# Patient Record
Sex: MALE | Race: WHITE | Employment: FULL TIME | URBAN - METROPOLITAN AREA
[De-identification: names, ages, dates, MRNs, and addresses within clinical notes are randomized per-mention and may not be internally consistent; named-entity substitution may affect disease eponyms.]

---

## 2019-03-12 ENCOUNTER — APPOINTMENT (OUTPATIENT)
Dept: GENERAL RADIOLOGY | Age: 44
End: 2019-03-12
Payer: COMMERCIAL

## 2019-03-12 ENCOUNTER — HOSPITAL ENCOUNTER (EMERGENCY)
Age: 44
Discharge: HOME | End: 2019-03-12
Attending: EMERGENCY MEDICINE
Payer: COMMERCIAL

## 2019-03-12 VITALS
RESPIRATION RATE: 18 BRPM | OXYGEN SATURATION: 96 % | DIASTOLIC BLOOD PRESSURE: 79 MMHG | WEIGHT: 200 LBS | HEART RATE: 71 BPM | TEMPERATURE: 100.6 F | HEIGHT: 70 IN | SYSTOLIC BLOOD PRESSURE: 138 MMHG | BODY MASS INDEX: 28.63 KG/M2

## 2019-03-12 DIAGNOSIS — J18.9 PNEUMONIA DUE TO ORGANISM: Primary | ICD-10-CM

## 2019-03-12 LAB
ALBUMIN SERPL-MCNC: 4.4 G/DL (ref 3.5–4.6)
ALP BLD-CCNC: 98 U/L (ref 35–104)
ALT SERPL-CCNC: 42 U/L (ref 0–41)
ANION GAP SERPL CALCULATED.3IONS-SCNC: 11 MEQ/L (ref 9–15)
AST SERPL-CCNC: 27 U/L (ref 0–40)
BASOPHILS ABSOLUTE: 0 K/UL (ref 0–0.2)
BASOPHILS RELATIVE PERCENT: 0.3 %
BILIRUB SERPL-MCNC: 0.6 MG/DL (ref 0.2–0.7)
BUN BLDV-MCNC: 8 MG/DL (ref 6–20)
CALCIUM SERPL-MCNC: 9.4 MG/DL (ref 8.5–9.9)
CHLORIDE BLD-SCNC: 96 MEQ/L (ref 95–107)
CO2: 25 MEQ/L (ref 20–31)
CREAT SERPL-MCNC: 0.93 MG/DL (ref 0.7–1.2)
EOSINOPHILS ABSOLUTE: 0.1 K/UL (ref 0–0.7)
EOSINOPHILS RELATIVE PERCENT: 1.3 %
GFR AFRICAN AMERICAN: >60
GFR NON-AFRICAN AMERICAN: >60
GLOBULIN: 3.2 G/DL (ref 2.3–3.5)
GLUCOSE BLD-MCNC: 119 MG/DL (ref 70–99)
HCT VFR BLD CALC: 48.4 % (ref 42–52)
HEMOGLOBIN: 16.7 G/DL (ref 14–18)
LYMPHOCYTES ABSOLUTE: 1.2 K/UL (ref 1–4.8)
LYMPHOCYTES RELATIVE PERCENT: 10.9 %
MCH RBC QN AUTO: 31.7 PG (ref 27–31.3)
MCHC RBC AUTO-ENTMCNC: 34.5 % (ref 33–37)
MCV RBC AUTO: 91.9 FL (ref 80–100)
MONOCYTES ABSOLUTE: 1.1 K/UL (ref 0.2–0.8)
MONOCYTES RELATIVE PERCENT: 9.3 %
NEUTROPHILS ABSOLUTE: 8.9 K/UL (ref 1.4–6.5)
NEUTROPHILS RELATIVE PERCENT: 78.2 %
PDW BLD-RTO: 13.5 % (ref 11.5–14.5)
PLATELET # BLD: 166 K/UL (ref 130–400)
POTASSIUM SERPL-SCNC: 4.1 MEQ/L (ref 3.4–4.9)
RAPID INFLUENZA  B AGN: NEGATIVE
RAPID INFLUENZA A AGN: NEGATIVE
RBC # BLD: 5.26 M/UL (ref 4.7–6.1)
SODIUM BLD-SCNC: 132 MEQ/L (ref 135–144)
TOTAL PROTEIN: 7.6 G/DL (ref 6.3–8)
WBC # BLD: 11.3 K/UL (ref 4.8–10.8)

## 2019-03-12 PROCEDURE — 2580000003 HC RX 258: Performed by: EMERGENCY MEDICINE

## 2019-03-12 PROCEDURE — 36415 COLL VENOUS BLD VENIPUNCTURE: CPT

## 2019-03-12 PROCEDURE — 87804 INFLUENZA ASSAY W/OPTIC: CPT

## 2019-03-12 PROCEDURE — 6360000002 HC RX W HCPCS: Performed by: EMERGENCY MEDICINE

## 2019-03-12 PROCEDURE — 71046 X-RAY EXAM CHEST 2 VIEWS: CPT

## 2019-03-12 PROCEDURE — 99284 EMERGENCY DEPT VISIT MOD MDM: CPT

## 2019-03-12 PROCEDURE — 85025 COMPLETE CBC W/AUTO DIFF WBC: CPT

## 2019-03-12 PROCEDURE — 6370000000 HC RX 637 (ALT 250 FOR IP): Performed by: EMERGENCY MEDICINE

## 2019-03-12 PROCEDURE — 96374 THER/PROPH/DIAG INJ IV PUSH: CPT

## 2019-03-12 PROCEDURE — 80053 COMPREHEN METABOLIC PANEL: CPT

## 2019-03-12 PROCEDURE — 96375 TX/PRO/DX INJ NEW DRUG ADDON: CPT

## 2019-03-12 RX ORDER — QUETIAPINE FUMARATE 300 MG/1
300 TABLET, FILM COATED ORAL NIGHTLY
COMMUNITY

## 2019-03-12 RX ORDER — AZITHROMYCIN 500 MG/1
500 TABLET, FILM COATED ORAL ONCE
Status: COMPLETED | OUTPATIENT
Start: 2019-03-12 | End: 2019-03-12

## 2019-03-12 RX ORDER — ONDANSETRON 2 MG/ML
4 INJECTION INTRAMUSCULAR; INTRAVENOUS ONCE
Status: COMPLETED | OUTPATIENT
Start: 2019-03-12 | End: 2019-03-12

## 2019-03-12 RX ORDER — 0.9 % SODIUM CHLORIDE 0.9 %
1000 INTRAVENOUS SOLUTION INTRAVENOUS ONCE
Status: COMPLETED | OUTPATIENT
Start: 2019-03-12 | End: 2019-03-12

## 2019-03-12 RX ORDER — ACETAMINOPHEN 500 MG
1000 TABLET ORAL ONCE
Status: COMPLETED | OUTPATIENT
Start: 2019-03-12 | End: 2019-03-12

## 2019-03-12 RX ORDER — KETOROLAC TROMETHAMINE 30 MG/ML
30 INJECTION, SOLUTION INTRAMUSCULAR; INTRAVENOUS ONCE
Status: COMPLETED | OUTPATIENT
Start: 2019-03-12 | End: 2019-03-12

## 2019-03-12 RX ORDER — AZITHROMYCIN 250 MG/1
TABLET, FILM COATED ORAL
Qty: 6 TABLET | Refills: 0 | Status: SHIPPED | OUTPATIENT
Start: 2019-03-12 | End: 2019-03-22

## 2019-03-12 RX ADMIN — CEFTRIAXONE SODIUM 1 G: 1 INJECTION, POWDER, FOR SOLUTION INTRAMUSCULAR; INTRAVENOUS at 03:15

## 2019-03-12 RX ADMIN — SODIUM CHLORIDE 1000 ML: 9 INJECTION, SOLUTION INTRAVENOUS at 02:31

## 2019-03-12 RX ADMIN — ACETAMINOPHEN 1000 MG: 500 TABLET ORAL at 02:30

## 2019-03-12 RX ADMIN — ONDANSETRON 4 MG: 2 INJECTION INTRAMUSCULAR; INTRAVENOUS at 02:30

## 2019-03-12 RX ADMIN — AZITHROMYCIN MONOHYDRATE 500 MG: 500 TABLET ORAL at 03:15

## 2019-03-12 RX ADMIN — KETOROLAC TROMETHAMINE 30 MG: 30 INJECTION, SOLUTION INTRAMUSCULAR; INTRAVENOUS at 02:30

## 2019-03-12 SDOH — HEALTH STABILITY: MENTAL HEALTH: HOW OFTEN DO YOU HAVE A DRINK CONTAINING ALCOHOL?: NEVER

## 2019-03-12 ASSESSMENT — ENCOUNTER SYMPTOMS
ABDOMINAL PAIN: 0
COUGH: 0
PHOTOPHOBIA: 0
RHINORRHEA: 1
CHEST TIGHTNESS: 0
EYE DISCHARGE: 0
SHORTNESS OF BREATH: 0
SORE THROAT: 0
VOMITING: 0
ABDOMINAL DISTENTION: 0
WHEEZING: 0

## 2019-03-12 ASSESSMENT — PAIN DESCRIPTION - LOCATION
LOCATION: GENERALIZED
LOCATION: CHEST

## 2019-03-12 ASSESSMENT — PAIN SCALES - GENERAL
PAINLEVEL_OUTOF10: 5
PAINLEVEL_OUTOF10: 9
PAINLEVEL_OUTOF10: 8

## 2019-03-12 ASSESSMENT — PAIN DESCRIPTION - PAIN TYPE
TYPE: ACUTE PAIN
TYPE: ACUTE PAIN

## 2019-03-12 ASSESSMENT — PAIN DESCRIPTION - DESCRIPTORS
DESCRIPTORS: ACHING
DESCRIPTORS: ACHING

## 2019-03-12 ASSESSMENT — PAIN DESCRIPTION - PROGRESSION: CLINICAL_PROGRESSION: GRADUALLY IMPROVING

## 2019-03-12 ASSESSMENT — PAIN DESCRIPTION - FREQUENCY: FREQUENCY: CONTINUOUS

## 2022-07-21 ENCOUNTER — OFFICE VISIT (OUTPATIENT)
Dept: ORTHOPEDIC SURGERY | Age: 47
End: 2022-07-21
Payer: COMMERCIAL

## 2022-07-21 ENCOUNTER — HOSPITAL ENCOUNTER (OUTPATIENT)
Dept: GENERAL RADIOLOGY | Age: 47
Discharge: HOME | End: 2022-07-23
Payer: COMMERCIAL

## 2022-07-21 VITALS
HEART RATE: 72 BPM | WEIGHT: 182 LBS | RESPIRATION RATE: 16 BRPM | TEMPERATURE: 97.8 F | HEIGHT: 70 IN | OXYGEN SATURATION: 98 % | BODY MASS INDEX: 26.05 KG/M2

## 2022-07-21 DIAGNOSIS — M77.11 LATERAL EPICONDYLITIS OF RIGHT ELBOW: Primary | ICD-10-CM

## 2022-07-21 DIAGNOSIS — M51.36 DDD (DEGENERATIVE DISC DISEASE), LUMBAR: ICD-10-CM

## 2022-07-21 PROCEDURE — 20551 NJX 1 TENDON ORIGIN/INSJ: CPT | Performed by: PHYSICIAN ASSISTANT

## 2022-07-21 PROCEDURE — 72110 X-RAY EXAM L-2 SPINE 4/>VWS: CPT

## 2022-07-21 RX ORDER — LIDOCAINE HYDROCHLORIDE 10 MG/ML
1 INJECTION, SOLUTION INFILTRATION; PERINEURAL ONCE
Status: COMPLETED | OUTPATIENT
Start: 2022-07-21 | End: 2022-07-21

## 2022-07-21 RX ORDER — CYCLOBENZAPRINE HCL 10 MG
10 TABLET ORAL 2 TIMES DAILY PRN
Qty: 90 TABLET | Refills: 0 | Status: SHIPPED | OUTPATIENT
Start: 2022-07-21 | End: 2022-09-04

## 2022-07-21 RX ORDER — MELOXICAM 15 MG/1
15 TABLET ORAL DAILY
Qty: 90 TABLET | Refills: 0 | Status: SHIPPED | OUTPATIENT
Start: 2022-07-21 | End: 2022-10-19

## 2022-07-21 RX ORDER — TRIAMCINOLONE ACETONIDE 40 MG/ML
40 INJECTION, SUSPENSION INTRA-ARTICULAR; INTRAMUSCULAR ONCE
Status: COMPLETED | OUTPATIENT
Start: 2022-07-21 | End: 2022-07-21

## 2022-07-21 RX ADMIN — TRIAMCINOLONE ACETONIDE 40 MG: 40 INJECTION, SUSPENSION INTRA-ARTICULAR; INTRAMUSCULAR at 09:57

## 2022-07-21 RX ADMIN — LIDOCAINE HYDROCHLORIDE 1 ML: 10 INJECTION, SOLUTION INFILTRATION; PERINEURAL at 09:59

## 2022-07-21 ASSESSMENT — ENCOUNTER SYMPTOMS: RESPIRATORY NEGATIVE: 1

## 2022-07-21 NOTE — PROGRESS NOTES
Leroy Warren (:  1975) is a 48 y.o. male,Established patient, here for evaluation of the following chief complaint(s):  Elbow Pain (Right, pt states he was previously getting cortisone injections, and he would like to discuss other options. ) and Back Problem (Pt states he has been having back issues for about the last 15 years, slowly getting worse.)         ASSESSMENT/PLAN:  1. Lateral epicondylitis of right elbow  2. DDD (degenerative disc disease), lumbar  -     XR LUMBAR SPINE (MIN 4 VIEWS); Future      No follow-ups on file. Subjective   SUBJECTIVE/OBJECTIVE:  Is a 76-year-old right-hand-dominant  that I have seen previously for lateral epicondylitis of the right arm. He has been receiving about 8 months relief from cortisone injection in the right elbow for epicondylitis. He presents today requesting cortisone injection. Denies any new injury however he works in a physically demanding job. He is also complaining of some back pain. He sees a chiropractor however, chiropractic manipulation is no longer proving as beneficial as previously. He needs forms filled out for [facility] as well as for light duty restrictions. He denies change in sensation of his extremities. Denies any bowel or bladder dysfunction. Review of Systems   Constitutional: Negative. HENT: Negative. Respiratory: Negative. Skin: Negative. Neurological: Negative. Objective     X-rays lumbar spine performed today reviewed by me show acute fracture dislocation. Disc spaces are well-maintained. No spondylolisthesis with flexion and extension views. Physical Exam  Musculoskeletal:      Comments: Right elbow-no swelling or ecchymosis. No warmth, erythema, fluctuance or sign of infection. Full extension, flexion is 125 degrees about 5 degrees less than the left. Elbow joint is stable, there is no laxity with radial or ulnar stress.   Tenderness with palpation directly over the lateral epicondyles. Elbow pain increases with resisted extension of the right wrist.  Elbow pain increases with resisted pronation of the right wrist.  Medial epicondyles and olecranon are nontender. Sensation is intact distally to light touch. Capillary refill is brisk. Lower back-no midline spinal tenderness. Tenderness with palpation in bilateral L3-S1 paraspinal region. Flexion is decreased at 75 degrees, extension is 10 degrees. Rotation of the right is 40 degrees, rotation to the left is 30 degrees. Right leg raise elicits lower back pain right greater than left. No leg pain. Heel-to-toe ambulation is intact and symmetrical.     Procedure-under sterile technique 1 cc of 1% lidocaine and 1 cc of 40 mg/mL Kenalog are injected into the proximal common extensor tendon sheath of the right elbow. Patient tolerated procedure well     I explained to the patient that he does have recurrent lateral epicondylitis. He like to proceed with a Tenex procedure late fall early winter 2022. I explained to him that his back x-rays do not show any significant degenerative arthritis. He does complain of some global neck thoracic and lumbar back pain. He has been seeing chiropractors for years. Will begin taking Flexeril at bedtime and Mobic during the day. He is to follow-up with me in about a month. On this date 7/21/2022 I have spent 35 minutes reviewing previous notes, test results and face to face with the patient discussing the diagnosis and importance of compliance with the treatment plan as well as documenting on the day of the visit. An electronic signature was used to authenticate this note.     --BRAD Gabriel

## 2022-07-21 NOTE — PROGRESS NOTES
A timeout was performed immediately prior to the start of the injection procedure and included the correct patient (two identifiers), correct procedure and correct site(s). Procedure consent and allergies were also verified. Patient's name, date of birth, and allergies have been confirmed. Patient is aware that injection is to be given in Right elbow. He/she is aware that they will be seeing [facility] and the injection will be given by him.

## 2022-08-25 ENCOUNTER — OFFICE VISIT (OUTPATIENT)
Dept: ORTHOPEDIC SURGERY | Age: 47
End: 2022-08-25
Payer: COMMERCIAL

## 2022-08-25 VITALS
HEART RATE: 75 BPM | TEMPERATURE: 98.1 F | OXYGEN SATURATION: 98 % | BODY MASS INDEX: 26.05 KG/M2 | HEIGHT: 70 IN | WEIGHT: 182 LBS

## 2022-08-25 DIAGNOSIS — M77.11 LATERAL EPICONDYLITIS OF RIGHT ELBOW: Primary | ICD-10-CM

## 2022-08-25 DIAGNOSIS — M51.36 DDD (DEGENERATIVE DISC DISEASE), LUMBAR: ICD-10-CM

## 2022-08-25 PROCEDURE — 99214 OFFICE O/P EST MOD 30 MIN: CPT | Performed by: PHYSICIAN ASSISTANT

## 2022-08-25 ASSESSMENT — ENCOUNTER SYMPTOMS: RESPIRATORY NEGATIVE: 1

## 2022-08-25 NOTE — PROGRESS NOTES
Frankie Pollard (:  1975) is a 48 y.o. male,Established patient, here for evaluation of the following chief complaint(s):  Follow-up (Lateral epicondylitis of right elbow. Patient states he hit his elbow couple days ago. Pt states elbow hurts a 5 since he bumped it.)         ASSESSMENT/PLAN:  1. Lateral epicondylitis of right elbow  2. DDD (degenerative disc disease), lumbar      No follow-ups on file. Subjective   SUBJECTIVE/OBJECTIVE:  Is a 47-year-old right-hand-dominant male with complaint of right elbow, neck and low back pain. He states he works a physically demanding job. His elbow was doing well after cortisone injection however then he bumped it once again. He would like to extend his FMLA for another month. He would like to then proceed with Tenex procedure on the right elbow. States the elbow is sore with a lot of forceful grasping pushing and pulling. He prefer to continue on light duty. Review of Systems   Constitutional: Negative. HENT: Negative. Respiratory: Negative. Skin: Negative. Neurological: Negative. Objective   Physical Exam  Musculoskeletal:      Comments: Right elbow-no swelling or ecchymosis. No warmth, erythema, fluctuance or sign of infection. Full extension, flexion to 130 degrees. Tenderness with palpation directly over the lateral epicondyle. Elbow pain increases with resisted extension of the right wrist.  Elbow pain increases with resisted pronation of the right wrist.  Medial epicondyle and olecranon are nontender. Sensation is intact distally to light touch. Lower back-diffuse lumbar tenderness bilateral paraspinal L2-L5. Flexion is decreased at 80 degrees, extension is 15 degrees. Straight leg raise elicit lower back pain but no leg pain. Heel-to-toe ambulation is intact. Sensation is intact distally to light touch. Explained to the patient he would benefit from a Tenex procedure for his right elbow.   He will meet with Dr. Gareth Simms at the [facility]. I gave him the number. I filled out his FMLA forms. He will follow-up as his symptoms dictate. On this date 8/25/2022 I have spent 33 minutes reviewing previous notes, test results and face to face with the patient discussing the diagnosis and importance of compliance with the treatment plan as well as documenting on the day of the visit. An electronic signature was used to authenticate this note.     --BRAD Louise

## 2022-09-14 ENCOUNTER — TELEPHONE (OUTPATIENT)
Dept: ORTHOPEDIC SURGERY | Age: 47
End: 2022-09-14

## 2022-09-14 NOTE — TELEPHONE ENCOUNTER
Carmen called in stating at this last appointment you stated he could extend his FMLA until the end of the year. Please advise patient FMLA ended 09/1/2022.

## 2023-01-27 ENCOUNTER — TELEPHONE (OUTPATIENT)
Dept: ORTHOPEDIC SURGERY | Age: 48
End: 2023-01-27

## 2023-01-27 NOTE — TELEPHONE ENCOUNTER
[place] Paperwork     Date Recived: 01/27/2023  Date completed and awaiting provider signature: 01/27/2023  Date signed and faxed:   Date scanned into chart:   Faxed to:       Provider  [] Joey Lala  [] Lizzie Spain  [] Giovany Florez  [] Nahomy Godoy  [] Cassie Ashton  [] Luis Daniel Turcios  [] Pam Barroso  [] Tammi Simons  [] Roberth moore PA-C  [x] Debbie Bunn PA-C    Patient is going to come in for an apt. He is calling [place] to get different paperwork.

## 2024-03-15 ENCOUNTER — OFFICE VISIT (OUTPATIENT)
Dept: ORTHOPEDIC SURGERY | Age: 49
End: 2024-03-15
Payer: COMMERCIAL

## 2024-03-15 VITALS
HEART RATE: 92 BPM | OXYGEN SATURATION: 97 % | TEMPERATURE: 98.9 F | BODY MASS INDEX: 26.92 KG/M2 | WEIGHT: 188 LBS | HEIGHT: 70 IN

## 2024-03-15 DIAGNOSIS — M75.81 RIGHT ROTATOR CUFF TENDINITIS: Primary | ICD-10-CM

## 2024-03-15 PROCEDURE — 99214 OFFICE O/P EST MOD 30 MIN: CPT | Performed by: PHYSICIAN ASSISTANT

## 2024-03-15 PROCEDURE — 20610 DRAIN/INJ JOINT/BURSA W/O US: CPT | Performed by: PHYSICIAN ASSISTANT

## 2024-03-15 RX ORDER — LIDOCAINE HYDROCHLORIDE 10 MG/ML
8 INJECTION, SOLUTION INFILTRATION; PERINEURAL ONCE
Status: COMPLETED | OUTPATIENT
Start: 2024-03-15 | End: 2024-03-15

## 2024-03-15 RX ORDER — TRIAMCINOLONE ACETONIDE 40 MG/ML
80 INJECTION, SUSPENSION INTRA-ARTICULAR; INTRAMUSCULAR ONCE
Status: COMPLETED | OUTPATIENT
Start: 2024-03-15 | End: 2024-03-15

## 2024-03-15 RX ADMIN — LIDOCAINE HYDROCHLORIDE 8 ML: 10 INJECTION, SOLUTION INFILTRATION; PERINEURAL at 09:27

## 2024-03-15 RX ADMIN — TRIAMCINOLONE ACETONIDE 80 MG: 40 INJECTION, SUSPENSION INTRA-ARTICULAR; INTRAMUSCULAR at 09:27

## 2024-03-15 ASSESSMENT — ENCOUNTER SYMPTOMS: RESPIRATORY NEGATIVE: 1

## 2024-03-15 NOTE — PROGRESS NOTES
Shakir Jara (:  1975) is a 48 y.o. male,Established patient, here for evaluation of the following chief complaint(s):  Shoulder Pain (Right shoulder pain, started 2022. Limited mobility. No longer having pain in the elbow.)         ASSESSMENT/PLAN:  1. Right rotator cuff tendinitis  -     UT ARTHROCENTESIS ASPIR&/INJ MAJOR JT/BURSA W/O US  -     lidocaine 1 % injection 8 mL; 8 mL, Intra-artICUlar, ONCE, 1 dose, On Fri 3/15/24 at 0930  -     triamcinolone acetonide (KENALOG-40) injection 80 mg; 80 mg, Intra-artICUlar, ONCE, 1 dose, On Fri 3/15/24 at 0930      No follow-ups on file.         Subjective   SUBJECTIVE/OBJECTIVE:  This is a 48-year-old right-hand-dominant male complaining of right shoulder pain.  He states while at work he was torquing down something and felt pain in his right shoulder about 7 to 10 days ago.  He states he is having difficulty reaching above shoulder level repetitively.  He denies change in sensation of the arm.        Review of Systems   Constitutional: Negative.    HENT: Negative.     Respiratory: Negative.     Skin: Negative.    Neurological: Negative.           Objective   Physical Exam  Musculoskeletal:      Comments: Right shoulder-no acromioclavicular, clavicle, SC joint tenderness with palpation.  Abduction and abduction strength is strong and symmetrical.  Internal rotation is 0 degrees, external rotation is 120 degrees about 10 degrees less than the left.  Supraspinatus test elicits pain but no specific weakness.  Apprehension sign is negative.  Spurling's is negative.  Biceps contour is intact.  Sensation is intact distally to light touch.     Shoulder Injection Procedure Note    Pre-operative Diagnosis:    Diagnosis Orders   1. Right rotator cuff tendinitis  UT ARTHROCENTESIS ASPIR&/INJ MAJOR JT/BURSA W/O US    lidocaine 1 % injection 8 mL    triamcinolone acetonide (KENALOG-40) injection 80 mg           Post-operative Diagnosis:    Diagnosis Orders   1. Right